# Patient Record
Sex: FEMALE | Race: WHITE | NOT HISPANIC OR LATINO | URBAN - METROPOLITAN AREA
[De-identification: names, ages, dates, MRNs, and addresses within clinical notes are randomized per-mention and may not be internally consistent; named-entity substitution may affect disease eponyms.]

---

## 2022-04-10 ENCOUNTER — HISTORICAL (OUTPATIENT)
Dept: ADMINISTRATIVE | Facility: HOSPITAL | Age: 62
End: 2022-04-10
Payer: MEDICAID

## 2022-04-26 VITALS
BODY MASS INDEX: 22.99 KG/M2 | DIASTOLIC BLOOD PRESSURE: 69 MMHG | WEIGHT: 134.69 LBS | SYSTOLIC BLOOD PRESSURE: 121 MMHG | HEIGHT: 64 IN

## 2022-06-23 ENCOUNTER — OFFICE VISIT (OUTPATIENT)
Dept: HEMATOLOGY/ONCOLOGY | Facility: CLINIC | Age: 62
End: 2022-06-23
Payer: MEDICAID

## 2022-06-23 VITALS
WEIGHT: 114.63 LBS | BODY MASS INDEX: 19.57 KG/M2 | DIASTOLIC BLOOD PRESSURE: 66 MMHG | HEART RATE: 55 BPM | RESPIRATION RATE: 15 BRPM | SYSTOLIC BLOOD PRESSURE: 150 MMHG | TEMPERATURE: 97 F | HEIGHT: 64 IN | OXYGEN SATURATION: 100 %

## 2022-06-23 DIAGNOSIS — O22.30 DVT (DEEP VEIN THROMBOSIS) IN PREGNANCY: Primary | ICD-10-CM

## 2022-06-23 PROCEDURE — 3078F PR MOST RECENT DIASTOLIC BLOOD PRESSURE < 80 MM HG: ICD-10-PCS | Mod: CPTII,,,

## 2022-06-23 PROCEDURE — 3077F PR MOST RECENT SYSTOLIC BLOOD PRESSURE >= 140 MM HG: ICD-10-PCS | Mod: CPTII,,,

## 2022-06-23 PROCEDURE — 3077F SYST BP >= 140 MM HG: CPT | Mod: CPTII,,,

## 2022-06-23 PROCEDURE — 3078F DIAST BP <80 MM HG: CPT | Mod: CPTII,,,

## 2022-06-23 PROCEDURE — 99999 PR PBB SHADOW E&M-EST. PATIENT-LVL III: CPT | Mod: PBBFAC,,,

## 2022-06-23 PROCEDURE — 1159F PR MEDICATION LIST DOCUMENTED IN MEDICAL RECORD: ICD-10-PCS | Mod: CPTII,,,

## 2022-06-23 PROCEDURE — 99213 OFFICE O/P EST LOW 20 MIN: CPT | Mod: S$PBB,,,

## 2022-06-23 PROCEDURE — 1159F MED LIST DOCD IN RCRD: CPT | Mod: CPTII,,,

## 2022-06-23 PROCEDURE — 3008F PR BODY MASS INDEX (BMI) DOCUMENTED: ICD-10-PCS | Mod: CPTII,,,

## 2022-06-23 PROCEDURE — 3008F BODY MASS INDEX DOCD: CPT | Mod: CPTII,,,

## 2022-06-23 PROCEDURE — 99213 PR OFFICE/OUTPT VISIT, EST, LEVL III, 20-29 MIN: ICD-10-PCS | Mod: S$PBB,,,

## 2022-06-23 PROCEDURE — 99999 PR PBB SHADOW E&M-EST. PATIENT-LVL III: ICD-10-PCS | Mod: PBBFAC,,,

## 2022-06-23 RX ORDER — LANOLIN ALCOHOL/MO/W.PET/CERES
1 CREAM (GRAM) TOPICAL DAILY
COMMUNITY

## 2022-06-23 RX ORDER — METOPROLOL TARTRATE 50 MG/1
1 TABLET ORAL 2 TIMES DAILY
COMMUNITY
Start: 2022-05-27

## 2022-06-23 RX ORDER — MONTELUKAST SODIUM 10 MG/1
1 TABLET ORAL DAILY
COMMUNITY
Start: 2022-05-27

## 2022-06-23 RX ORDER — PANTOPRAZOLE SODIUM 40 MG/1
1 TABLET, DELAYED RELEASE ORAL EVERY MORNING
COMMUNITY
Start: 2022-03-28

## 2022-06-23 NOTE — PROGRESS NOTES
" [facility] Hematology/Oncology  PROGRESS NOTE      Subjective:         NAME: Jenn Joshua : 1960     61 y.o. female   MRN: [id number]    Referring Doc: No ref. provider found  Other Physicians:  Referring Physician: Jacoby Grossman MD   PCP: AUTUMN López   Pulmonary: Dr. Isbell   Surgery: Dr. Sanchez       Chief Complaint:    Chief Complaint   Patient presents with    Deep Vein Thrombosis     Pt reports bilateral leg pains for x1 week.       Current Treatment:    History of Present Illness:   Ms. Joshua is a 60yo female referred by Dr. 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   At 13yo - DVT - anticoagulated for "a few months." No obvious provoking event.   At 22yo - DVT & PE (in the hospital for "pneumonia"). Anticoagulated for about 6 months. She reports she had just had her daughter a few months before.   Never on OCP.   Her maternal grandmother had blood clot in her 70s (she was admitted at the time with illness).   + weight loss - reports she fluctuates.   + chronic "sinus drip" cough. Breathing is "ok."   Smoking is down to 1/2ppd. Prior 1ppd x 15.   Pt reports MMG last week in [place] - records requested.         Interval History:  Jenn returns for f/u. Her right hip reconstruction has not been scheduled as prosthetic implants are on order. She is doing well and has not complaints. She has f/u visits with  urology  in [place]  for hyponatremia. She denies sob, chest pain, edema of BLE. She does complain of pain in her legs but relates it to problems with right hip. She uses a walker for gait mobility.     ROS:   GEN: normal without any fever, night sweats or weight loss  HEENT: normal with no HA's, sore throat, stiff neck, changes in vision  CV: normal with no CP, SOB, PND, MARTINEZ or orthopnea  PULM: normal with no SOB, cough, hemoptysis, sputum or pleuritic pain  GI: normal with no abdominal pain, nausea, vomiting, constipation, diarrhea, melanotic stools, BRBPR, or hematemesis  : normal with " no hematuria, dysuria  BREAST: normal with no mass, discharge, pain  SKIN: normal with no rash, erythema, bruising, or swelling    Allergies:  Review of patient's allergies indicates:   Allergen Reactions    Aspirin      Other reaction(s): Unknown    Ibuprofen      Other reaction(s): Unknown    Propoxyphene      Other reaction(s): Unknown       Medications:  No current outpatient medications on file.    PMHx/PSHx Updates:   Past Medical History:   Diagnosis Date    Chronic obstructive lung disease     History of DVT (deep vein thrombosis)     History of pulmonary embolus (PE)     HTN (hypertension)     Mild intermittent asthma, uncomplicated     Osteoarthritis of right hip     Systolic murmur at cardiac apex     Tobacco user      Past Surgical History:   Procedure Laterality Date    APPENDECTOMY      CHOLECYSTECTOMY      COLONOSCOPY  05/18/2017    ESOPHAGOGASTRODUODENOSCOPY  05/18/2017    HYSTERECTOMY      Left Leg Surgery         Family History:  Family History   Problem Relation Age of Onset    Heart disease Mother     Heart failure Mother     Hypertension Mother     Heart disease Father     Hypertension Father        Social History:  Social History     Tobacco Use    Smoking status: Current Every Day Smoker     Types: Cigarettes    Smokeless tobacco: Never Used   Substance and Sexual Activity    Alcohol use: Yes    Drug use: Not Currently     Comment: Prescription Medications         Pathology:        Objective:     ECOG SCORE           Vitals:  There were no vitals taken for this visit.    Physical Examination:   GEN: no apparent distress, comfortable; AAOx3  HEAD: atraumatic and normocephalic  EYES: no pallor, no icterus, PERRLA  ENT: OMM, no pharyngeal erythema, external ears WNL; no nasal discharge; no thrush  NECK: no masses, thyroid normal, trachea midline, no LAD/LN's, supple  CV: RRR with no murmur; normal pulse; normal S1 and S2; no pedal edema  CHEST: Normal respiratory effort;  CTAB; normal breath sounds; no wheeze or crackles  ABDOM: nontender and nondistended; soft; normal bowel sounds; no rebound/guarding  MUSC/Skeletal: ROM normal; no crepitus; joints normal; no deformities or arthropathy  EXTREM: no clubbing, cyanosis, inflammation or swelling  SKIN: no rashes, lesions, ulcers, petechiae or subcutaneous nodules  : no burroughs  NEURO: grossly intact; motor/sensory WNL; AAOx3; no tremors  PSYCH: normal mood, affect and behavior  LYMPH: normal cervical, supraclavicular, axillary and groin LN's      Labs:   No visits with results within 1 Week(s) from this visit.   Latest known visit with results is:   No results found for any previous visit.         Assessment/Plan:   1. H/o DVT - 2 previously, most recent at 22yo. She has not been anticoagulated since about 6mo after that clot. She has had surgeries since that time without VTE. I do not think hypercoagulable work up is indicated at this time. She does not need to resume anticoagulation at this time.   Recommend DVT prophylaxis post knee or hip surgery as per Ortho.   We did discuss signs/symptoms of PE/DVT to be on alert for.   2. Tobacco abuse - Advised smoking cessation. Patient declines assistance/intervention. Request any prior chest imaging. We discussed smoking can increase risk of clot and she should quit.         PLAN:  C/w PCP for follow-up care  RTC 1 year to discuss possible d/c from clinic  No labs needed    Discussion:     I have explained all of the above in detail and the patient understands all of the current recommendation(s). I have answered all of their questions to the best of my ability and to their complete satisfaction.   The patient is to continue with the current management plan.            Electronically signed by Germaine Toscano NP

## 2023-02-10 DIAGNOSIS — E87.1 HYPONATREMIA: Primary | ICD-10-CM

## 2023-08-08 ENCOUNTER — OFFICE VISIT (OUTPATIENT)
Dept: HEMATOLOGY/ONCOLOGY | Facility: CLINIC | Age: 63
End: 2023-08-08
Payer: MEDICAID

## 2023-08-08 VITALS
HEIGHT: 64 IN | DIASTOLIC BLOOD PRESSURE: 89 MMHG | BODY MASS INDEX: 21.04 KG/M2 | OXYGEN SATURATION: 97 % | HEART RATE: 59 BPM | SYSTOLIC BLOOD PRESSURE: 140 MMHG | RESPIRATION RATE: 18 BRPM | WEIGHT: 123.25 LBS | TEMPERATURE: 98 F

## 2023-08-08 DIAGNOSIS — R19.7 DIARRHEA: Primary | ICD-10-CM

## 2023-08-08 DIAGNOSIS — O22.30 DVT (DEEP VEIN THROMBOSIS) IN PREGNANCY: Primary | ICD-10-CM

## 2023-08-08 PROCEDURE — 99214 PR OFFICE/OUTPT VISIT, EST, LEVL IV, 30-39 MIN: ICD-10-PCS | Mod: S$PBB,,, | Performed by: NURSE PRACTITIONER

## 2023-08-08 PROCEDURE — 3008F BODY MASS INDEX DOCD: CPT | Mod: CPTII,,, | Performed by: NURSE PRACTITIONER

## 2023-08-08 PROCEDURE — 1160F RVW MEDS BY RX/DR IN RCRD: CPT | Mod: CPTII,,, | Performed by: NURSE PRACTITIONER

## 2023-08-08 PROCEDURE — 3077F SYST BP >= 140 MM HG: CPT | Mod: CPTII,,, | Performed by: NURSE PRACTITIONER

## 2023-08-08 PROCEDURE — 1159F MED LIST DOCD IN RCRD: CPT | Mod: CPTII,,, | Performed by: NURSE PRACTITIONER

## 2023-08-08 PROCEDURE — 1159F PR MEDICATION LIST DOCUMENTED IN MEDICAL RECORD: ICD-10-PCS | Mod: CPTII,,, | Performed by: NURSE PRACTITIONER

## 2023-08-08 PROCEDURE — 3077F PR MOST RECENT SYSTOLIC BLOOD PRESSURE >= 140 MM HG: ICD-10-PCS | Mod: CPTII,,, | Performed by: NURSE PRACTITIONER

## 2023-08-08 PROCEDURE — 99999 PR PBB SHADOW E&M-EST. PATIENT-LVL IV: CPT | Mod: PBBFAC,,, | Performed by: NURSE PRACTITIONER

## 2023-08-08 PROCEDURE — 3008F PR BODY MASS INDEX (BMI) DOCUMENTED: ICD-10-PCS | Mod: CPTII,,, | Performed by: NURSE PRACTITIONER

## 2023-08-08 PROCEDURE — 99999 PR PBB SHADOW E&M-EST. PATIENT-LVL IV: ICD-10-PCS | Mod: PBBFAC,,, | Performed by: NURSE PRACTITIONER

## 2023-08-08 PROCEDURE — 3079F DIAST BP 80-89 MM HG: CPT | Mod: CPTII,,, | Performed by: NURSE PRACTITIONER

## 2023-08-08 PROCEDURE — 1160F PR REVIEW ALL MEDS BY PRESCRIBER/CLIN PHARMACIST DOCUMENTED: ICD-10-PCS | Mod: CPTII,,, | Performed by: NURSE PRACTITIONER

## 2023-08-08 PROCEDURE — 3079F PR MOST RECENT DIASTOLIC BLOOD PRESSURE 80-89 MM HG: ICD-10-PCS | Mod: CPTII,,, | Performed by: NURSE PRACTITIONER

## 2023-08-08 PROCEDURE — 99214 OFFICE O/P EST MOD 30 MIN: CPT | Mod: PBBFAC | Performed by: NURSE PRACTITIONER

## 2023-08-08 PROCEDURE — 99214 OFFICE O/P EST MOD 30 MIN: CPT | Mod: S$PBB,,, | Performed by: NURSE PRACTITIONER

## 2023-08-08 RX ORDER — ONDANSETRON 4 MG/1
4 TABLET, FILM COATED ORAL EVERY 6 HOURS PRN
COMMUNITY
Start: 2023-03-03

## 2023-08-08 RX ORDER — GABAPENTIN 600 MG/1
1 TABLET ORAL DAILY
COMMUNITY
Start: 2023-07-12

## 2023-08-08 NOTE — PROGRESS NOTES
" [facility] Hematology/Oncology  PROGRESS NOTE      Subjective:         NAME: Jenn Joshua : 1960     62 y.o. female   MRN: [id number]    Referring Doc: No ref. provider found  Other Physicians:  Referring Physician: Jacoby Grossman MD   PCP: AUTUMN López   Pulmonary: Dr. Isbell   Surgery: Dr. Sanchez       Chief Complaint:    Chief Complaint   Patient presents with    Deep Vein Thrombosis     Pt reports no new issues or concerns        Current Treatment:    History of Present Illness:   Ms. Joshua is a 60yo female referred by Dr. 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   At 15yo - DVT - anticoagulated for "a few months." No obvious provoking event.   At 20yo - DVT & PE (in the hospital for "pneumonia"). Anticoagulated for about 6 months. She reports she had just had her daughter a few months before.   Never on OCP.   Her maternal grandmother had blood clot in her 70s (she was admitted at the time with illness).   + weight loss - reports she fluctuates.   + chronic "sinus drip" cough. Breathing is "ok."   Smoking is down to 1/2ppd. Prior 1ppd x 15.   Pt reports MMG last week in [place] - records requested.         Interval History:  Jenn returns for f/u. Her total hip reconstruction was 2023. She was placed on Xarelto for 2 mos s/p the procedure and now she is off. She denies any s/s of DVT or PE since the surgery. She is currently undergoing PT and remains active at home walking around. She plans on undergoing knee replacement surgery next year. She complains of diarrhea daily and abdominal burning. She has a history of ulcers and is not currently following with a GI. Her former GI is no longer in practice in the area. She had a colonoscopy a few years ago, but cannot remember when. She request referral to GI.She denies sob, chest pain, or edema of BLE. She uses a walker for gait mobility.     ROS:   GEN: normal without any fever, night sweats or weight loss  HEENT: normal with no HA's, sore " throat, stiff neck, changes in vision  CV: normal with no CP, SOB, PND, MARTINEZ or orthopnea  PULM: normal with no SOB, cough, hemoptysis, sputum or pleuritic pain  GI: normal with no abdominal pain, nausea, vomiting, constipation, +diarrhea,  BRBPR, or hematemesis  : normal with no hematuria, dysuria  BREAST: normal with no mass, discharge, pain  SKIN: normal with no rash, erythema, bruising, or swelling    Allergies:  Review of patient's allergies indicates:   Allergen Reactions    Aspirin      Other reaction(s): Unknown    Ibuprofen      Other reaction(s): Unknown    Propoxyphene      Other reaction(s): Unknown       Medications:    Current Outpatient Medications:     gabapentin (NEURONTIN) 600 MG tablet, Take 1 tablet by mouth once daily., Disp: , Rfl:     magnesium oxide (MAG-OX) 400 mg (241.3 mg magnesium) tablet, Take 1 tablet by mouth once daily., Disp: , Rfl:     metoprolol tartrate (LOPRESSOR) 50 MG tablet, Take 1 tablet by mouth 2 (two) times a day., Disp: , Rfl:     montelukast (SINGULAIR) 10 mg tablet, Take 1 tablet by mouth once daily at 6am., Disp: , Rfl:     ondansetron (ZOFRAN) 4 MG tablet, Take 4 mg by mouth every 6 (six) hours as needed., Disp: , Rfl:     pantoprazole (PROTONIX) 40 MG tablet, Take 1 tablet by mouth every morning., Disp: , Rfl:     sodium chloride 1 gram tablet, Take 1 g by mouth 3 (three) times daily., Disp: , Rfl:     PMHx/PSHx Updates:   Past Medical History:   Diagnosis Date    Chronic obstructive lung disease     History of DVT (deep vein thrombosis)     History of pulmonary embolus (PE)     HTN (hypertension)     Mild intermittent asthma, uncomplicated     Osteoarthritis of right hip     Systolic murmur at cardiac apex     Tobacco user      Past Surgical History:   Procedure Laterality Date    APPENDECTOMY      CHOLECYSTECTOMY      COLONOSCOPY  05/18/2017    ESOPHAGOGASTRODUODENOSCOPY  05/18/2017    HYSTERECTOMY      Left Leg Surgery         Family History:  Family History  "  Problem Relation Age of Onset    Heart disease Mother     Heart failure Mother     Hypertension Mother     Heart disease Father     Hypertension Father        Social History:  Social History     Socioeconomic History    Marital status: Unknown   Tobacco Use    Smoking status: Every Day     Current packs/day: 0.00     Types: Cigarettes    Smokeless tobacco: Never   Substance and Sexual Activity    Alcohol use: Yes    Drug use: Not Currently     Comment: Prescription Medications         Pathology:        Objective:     ECOG SCORE             Vitals:  Blood pressure (!) 140/89, pulse (!) 59, temperature 98.1 °F (36.7 °C), resp. rate 18, height 5' 4" (1.626 m), weight 55.9 kg (123 lb 3.8 oz), SpO2 97 %.    Physical Examination:   GEN: no apparent distress, comfortable; AAOx3  HEAD: atraumatic and normocephalic  EYES: no pallor, no icterus, PERRLA  ENT: OMM, no pharyngeal erythema, external ears WNL; no nasal discharge; no thrush  NECK: no masses, thyroid normal, trachea midline, no LAD/LN's, supple  CV: RRR with no murmur; normal pulse; normal S1 and S2; no pedal edema  CHEST: Normal respiratory effort; CTAB; normal breath sounds; no wheeze or crackles  ABDOM: nontender and nondistended; soft; normal bowel sounds; no rebound/guarding  MUSC/Skeletal: ROM normal; no crepitus; joints normal; no deformities or arthropathy  EXTREM: no clubbing, cyanosis, inflammation or swelling  SKIN: no rashes, lesions, ulcers, petechiae or subcutaneous nodules  : no burroughs  NEURO: grossly intact; motor/sensory WNL; AAOx3; no tremors  PSYCH: normal mood, affect and behavior  LYMPH: normal cervical, supraclavicular, axillary and groin LN's      Labs:   No visits with results within 1 Week(s) from this visit.   Latest known visit with results is:   No results found for any previous visit.         Assessment/Plan:   1. H/o DVT - 2 previously, last DVT at 20yo. She has not been anticoagulated since about 6mo after that clot. She has had " surgeries since that time without VTE. No hypercoagulable work up is indicated at this time. She does not need to resume anticoagulation at this time.   Recommend DVT prophylaxis post knee or hip surgery as per Ortho.   We did discuss signs/symptoms of PE/DVT to be on alert for.   2. Tobacco abuse - Advised smoking cessation. Patient declines assistance/intervention. We discussed smoking can increase risk of clot and she should quit.         PLAN:  C/w PCP for follow-up care  Refer to GI   RTC 1 year in preparation for knee surgery and possible DVT prevention therapy.   No labs needed    Discussion:     I have explained all of the above in detail and the patient understands all of the current recommendation(s). I have answered all of their questions to the best of my ability and to their complete satisfaction.   The patient is to continue with the current management plan.            Electronically signed by Carmita Justice NP

## 2023-09-15 ENCOUNTER — DOCUMENTATION ONLY (OUTPATIENT)
Dept: HEMATOLOGY/ONCOLOGY | Facility: CLINIC | Age: 63
End: 2023-09-15
Payer: MEDICAID

## 2023-09-15 NOTE — PROGRESS NOTES
[facility] GI clinic called to check on GI Referral. They informed me that they area waiting on paper from previous doctor. Patient called to ask which GI have she seen before, but she was sleeping. Spoke with patient , but he didn't know doctor name.

## 2024-07-25 DIAGNOSIS — O22.30 DVT (DEEP VEIN THROMBOSIS) IN PREGNANCY: Primary | ICD-10-CM

## 2024-08-01 ENCOUNTER — LAB VISIT (OUTPATIENT)
Dept: LAB | Facility: HOSPITAL | Age: 64
End: 2024-08-01
Payer: MEDICAID

## 2024-08-01 ENCOUNTER — OFFICE VISIT (OUTPATIENT)
Dept: HEMATOLOGY/ONCOLOGY | Facility: CLINIC | Age: 64
End: 2024-08-01
Payer: MEDICAID

## 2024-08-01 VITALS
SYSTOLIC BLOOD PRESSURE: 129 MMHG | DIASTOLIC BLOOD PRESSURE: 73 MMHG | BODY MASS INDEX: 20.28 KG/M2 | RESPIRATION RATE: 18 BRPM | TEMPERATURE: 98 F | WEIGHT: 118.81 LBS | OXYGEN SATURATION: 98 % | HEART RATE: 94 BPM | HEIGHT: 64 IN

## 2024-08-01 DIAGNOSIS — O22.30 DVT (DEEP VEIN THROMBOSIS) IN PREGNANCY: Primary | ICD-10-CM

## 2024-08-01 DIAGNOSIS — O22.30 DVT (DEEP VEIN THROMBOSIS) IN PREGNANCY: ICD-10-CM

## 2024-08-01 LAB
ALBUMIN SERPL-MCNC: 4.3 G/DL (ref 3.4–4.8)
ALBUMIN/GLOB SERPL: 1.3 RATIO (ref 1.1–2)
ALP SERPL-CCNC: 84 UNIT/L (ref 40–150)
ALT SERPL-CCNC: 9 UNIT/L (ref 0–55)
ANION GAP SERPL CALC-SCNC: 10 MEQ/L
APTT PPP: 33.8 SECONDS (ref 24.6–37.2)
AST SERPL-CCNC: 19 UNIT/L (ref 5–34)
BASOPHILS # BLD AUTO: 0.06 X10(3)/MCL
BASOPHILS NFR BLD AUTO: 1.1 %
BILIRUB SERPL-MCNC: 0.6 MG/DL
BUN SERPL-MCNC: 7 MG/DL (ref 9.8–20.1)
CALCIUM SERPL-MCNC: 9.9 MG/DL (ref 8.4–10.2)
CHLORIDE SERPL-SCNC: 93 MMOL/L (ref 98–107)
CO2 SERPL-SCNC: 24 MMOL/L (ref 23–31)
CREAT SERPL-MCNC: 0.92 MG/DL (ref 0.55–1.02)
CREAT/UREA NIT SERPL: 8
EOSINOPHIL # BLD AUTO: 0.22 X10(3)/MCL (ref 0–0.9)
EOSINOPHIL NFR BLD AUTO: 4 %
ERYTHROCYTE [DISTWIDTH] IN BLOOD BY AUTOMATED COUNT: 13.5 % (ref 11.5–17)
GFR SERPLBLD CREATININE-BSD FMLA CKD-EPI: >60 ML/MIN/1.73/M2
GLOBULIN SER-MCNC: 3.2 GM/DL (ref 2.4–3.5)
GLUCOSE SERPL-MCNC: 110 MG/DL (ref 82–115)
HCT VFR BLD AUTO: 39 % (ref 37–47)
HGB BLD-MCNC: 13.7 G/DL (ref 12–16)
IMM GRANULOCYTES # BLD AUTO: 0.02 X10(3)/MCL (ref 0–0.04)
IMM GRANULOCYTES NFR BLD AUTO: 0.4 %
INR PPP: 0.9
LYMPHOCYTES # BLD AUTO: 1.63 X10(3)/MCL (ref 0.6–4.6)
LYMPHOCYTES NFR BLD AUTO: 29.6 %
MCH RBC QN AUTO: 32.2 PG (ref 27–31)
MCHC RBC AUTO-ENTMCNC: 35.1 G/DL (ref 33–36)
MCV RBC AUTO: 91.5 FL (ref 80–94)
MONOCYTES # BLD AUTO: 0.41 X10(3)/MCL (ref 0.1–1.3)
MONOCYTES NFR BLD AUTO: 7.4 %
NEUTROPHILS # BLD AUTO: 3.17 X10(3)/MCL (ref 2.1–9.2)
NEUTROPHILS NFR BLD AUTO: 57.5 %
PLATELET # BLD AUTO: 279 X10(3)/MCL (ref 130–400)
PMV BLD AUTO: 9.6 FL (ref 7.4–10.4)
POTASSIUM SERPL-SCNC: 4.9 MMOL/L (ref 3.5–5.1)
PROT SERPL-MCNC: 7.5 GM/DL (ref 5.8–7.6)
PROTHROMBIN TIME: 12.8 SECONDS (ref 12.5–14.5)
RBC # BLD AUTO: 4.26 X10(6)/MCL (ref 4.2–5.4)
SODIUM SERPL-SCNC: 127 MMOL/L (ref 136–145)
WBC # BLD AUTO: 5.51 X10(3)/MCL (ref 4.5–11.5)

## 2024-08-01 PROCEDURE — 85025 COMPLETE CBC W/AUTO DIFF WBC: CPT

## 2024-08-01 PROCEDURE — 85610 PROTHROMBIN TIME: CPT

## 2024-08-01 PROCEDURE — 85730 THROMBOPLASTIN TIME PARTIAL: CPT

## 2024-08-01 PROCEDURE — 36415 COLL VENOUS BLD VENIPUNCTURE: CPT

## 2024-08-01 PROCEDURE — 99999 PR PBB SHADOW E&M-EST. PATIENT-LVL IV: CPT | Mod: PBBFAC,,, | Performed by: INTERNAL MEDICINE

## 2024-08-01 PROCEDURE — 99214 OFFICE O/P EST MOD 30 MIN: CPT | Mod: PBBFAC | Performed by: INTERNAL MEDICINE

## 2024-08-01 PROCEDURE — 80053 COMPREHEN METABOLIC PANEL: CPT

## 2024-08-01 RX ORDER — ACETAMINOPHEN AND CODEINE PHOSPHATE 300; 30 MG/1; MG/1
TABLET ORAL
COMMUNITY

## 2024-08-01 RX ORDER — ALBUTEROL SULFATE 0.83 MG/ML
SOLUTION RESPIRATORY (INHALATION)
COMMUNITY

## 2024-08-01 RX ORDER — BUTALBITAL, ACETAMINOPHEN AND CAFFEINE 50; 325; 40 MG/1; MG/1; MG/1
TABLET ORAL
COMMUNITY

## 2024-08-01 RX ORDER — FLUTICASONE FUROATE, UMECLIDINIUM BROMIDE AND VILANTEROL TRIFENATATE 100; 62.5; 25 UG/1; UG/1; UG/1
POWDER RESPIRATORY (INHALATION)
COMMUNITY
Start: 2024-07-19

## 2024-08-01 RX ORDER — AMLODIPINE BESYLATE 10 MG/1
TABLET ORAL
COMMUNITY

## 2024-08-01 NOTE — PROGRESS NOTES
" [facility] Hematology/Oncology  PROGRESS NOTE      Subjective:         NAME: Jenn Joshua : 1960     63 y.o. female   MRN: [id number]    Referring Doc: No ref. provider found  Other Physicians:  Referring Physician: Jacoby Grossman MD   PCP: AUTUMN López   Pulmonary: Dr. Isbell   Surgery: Dr. Sanchez       Chief Complaint:    No chief complaint on file.      Current Treatment:    History of Present Illness:   Ms. Joshua is a 62yo female referred by Dr. 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 She was seen by Dr. Araujo in   At 15yo - DVT - anticoagulated for "a few months." No obvious provoking event.   At 20yo - DVT & PE (in the hospital for "pneumonia"). Anticoagulated for about 6 months. She reports she had just had her daughter a few months before.   Never on OCP.   Her maternal grandmother had blood clot in her 70s (she was admitted at the time with illness).   + weight loss - reports she fluctuates.   + chronic "sinus drip" cough. Breathing is "ok."   Smoking is down to 1/2ppd. Prior 1ppd x 15.   Pt reports MMG last week in [place] - records requested.     Interval History:  24:  Unclear why pateint is here. Her surgery was a  year ago. She is not on Xarelto. She is using a walker.     23 Jenn returns for f/u. Her total hip reconstruction was 2023. She was placed on Xarelto for 2 mos s/p the procedure and now she is off. She denies any s/s of DVT or PE since the surgery. She is currently undergoing PT and remains active at home walking around. She plans on undergoing knee replacement surgery next year. She complains of diarrhea daily and abdominal burning. She has a history of ulcers and is not currently following with a GI. Her former GI is no longer in practice in the area. She had a colonoscopy a few years ago, but cannot remember when. She request referral to GI.She denies sob, chest pain, or edema of BLE. She uses a walker for gait mobility.     ROS:   GEN: normal without " any fever, night sweats or weight loss  HEENT: normal with no HA's, sore throat, stiff neck, changes in vision  CV: normal with no CP, SOB, PND, MARTINEZ or orthopnea  PULM: normal with no SOB, cough, hemoptysis, sputum or pleuritic pain  GI: normal with no abdominal pain, nausea, vomiting, constipation, +diarrhea,  BRBPR, or hematemesis  : normal with no hematuria, dysuria  BREAST: normal with no mass, discharge, pain  SKIN: normal with no rash, erythema, bruising, or swelling    Allergies:  Review of patient's allergies indicates:   Allergen Reactions    Aspirin      Other reaction(s): Unknown    Ibuprofen      Other reaction(s): Unknown    Propoxyphene      Other reaction(s): Unknown       Medications:    Current Outpatient Medications:     gabapentin (NEURONTIN) 600 MG tablet, Take 1 tablet by mouth once daily., Disp: , Rfl:     magnesium oxide (MAG-OX) 400 mg (241.3 mg magnesium) tablet, Take 1 tablet by mouth once daily., Disp: , Rfl:     metoprolol tartrate (LOPRESSOR) 50 MG tablet, Take 1 tablet by mouth 2 (two) times a day., Disp: , Rfl:     montelukast (SINGULAIR) 10 mg tablet, Take 1 tablet by mouth once daily at 6am., Disp: , Rfl:     ondansetron (ZOFRAN) 4 MG tablet, Take 4 mg by mouth every 6 (six) hours as needed., Disp: , Rfl:     pantoprazole (PROTONIX) 40 MG tablet, Take 1 tablet by mouth every morning., Disp: , Rfl:     sodium chloride 1 gram tablet, Take 1 g by mouth 3 (three) times daily., Disp: , Rfl:     PMHx/PSHx Updates:   Past Medical History:   Diagnosis Date    Chronic obstructive lung disease     History of DVT (deep vein thrombosis)     History of pulmonary embolus (PE)     HTN (hypertension)     Mild intermittent asthma, uncomplicated     Osteoarthritis of right hip     Systolic murmur at cardiac apex     Tobacco user      Past Surgical History:   Procedure Laterality Date    APPENDECTOMY      CHOLECYSTECTOMY      COLONOSCOPY  05/18/2017    ESOPHAGOGASTRODUODENOSCOPY  05/18/2017     HYSTERECTOMY      Left Leg Surgery         Family History:  Family History   Problem Relation Name Age of Onset    Heart disease Mother      Heart failure Mother      Hypertension Mother      Heart disease Father      Hypertension Father         Social History:  Social History     Socioeconomic History    Marital status: Unknown   Tobacco Use    Smoking status: Every Day     Types: Cigarettes    Smokeless tobacco: Never   Substance and Sexual Activity    Alcohol use: Yes    Drug use: Not Currently     Comment: Prescription Medications         Pathology:        Objective:     ECOG SCORE             Vitals:  There were no vitals taken for this visit.    Physical Examination:   GEN: no apparent distress, comfortable; AAOx3  HEAD: atraumatic and normocephalic  EYES: no pallor, no icterus, PERRLA  ENT: OMM, no pharyngeal erythema, external ears WNL; no nasal discharge; no thrush  NECK: no masses, thyroid normal, trachea midline, no LAD/LN's, supple  CV: RRR with no murmur; normal pulse; normal S1 and S2; no pedal edema  CHEST: Normal respiratory effort; CTAB; normal breath sounds; no wheeze or crackles  ABDOM: nontender and nondistended; soft; normal bowel sounds; no rebound/guarding  MUSC/Skeletal: ROM normal; no crepitus; joints normal; no deformities or arthropathy  EXTREM: no clubbing, cyanosis, inflammation or swelling  SKIN: no rashes, lesions, ulcers, petechiae or subcutaneous nodules  : no burroughs  NEURO: grossly intact; motor/sensory WNL; AAOx3; no tremors  PSYCH: normal mood, affect and behavior  LYMPH: normal cervical, supraclavicular, axillary and groin LN's      Labs:   No visits with results within 1 Week(s) from this visit.   Latest known visit with results is:   No results found for any previous visit.         Assessment/Plan:   1. H/o DVT - 2 previously, last DVT at 22yo. She has not been anticoagulated since about 6mo after that clot. She has had surgeries since that time without VTE. No hypercoagulable  work up is indicated at this time. She does not need to resume anticoagulation at this time.   Recommend DVT prophylaxis post knee or hip surgery as per Ortho.   We did discuss signs/symptoms of PE/DVT to be on alert for.   2. Tobacco abuse - Advised smoking cessation. Patient declines assistance/intervention. We discussed smoking can increase risk of clot and she should quit.   3. Has hyponatremia. She was on tablets. She is seeing Nephrology.   4.  Active smoker. She is getting low dose CT chest through Dr. Reyes in [place].   5. She is UTD with colonoscopies and MMG.     PLAN:  C/w PCP for follow-up care  Refer to GI   No labs needed    Discussion:   She has no further surgeries planned.   I have explained all of the above in detail and the patient understands all of the current recommendation(s). I have answered all of their questions to the best of my ability and to their complete satisfaction.   The patient is to continue with the current management plan.            Electronically signed by Chelsie Powers MD

## 2025-07-23 DIAGNOSIS — O22.30 DVT (DEEP VEIN THROMBOSIS) IN PREGNANCY: Primary | ICD-10-CM

## 2025-07-31 NOTE — PROGRESS NOTES
" [facility] Hematology/Oncology  PROGRESS NOTE      Subjective:         NAME: Jenn Joshua : 1960     64 y.o. female   MRN: [id number]    Referring Doc: No ref. provider found  Other Physicians:  Referring Physician: Jacoby Grossman MD   PCP: AUTUMN López   Pulmonary: Dr. Isbell   Surgery: Dr. Sanchez       Chief Complaint:    Chief Complaint   Patient presents with    Deep Vein Thrombosis     F/u with labs-- Pt reports no new concerns        Current Treatment:    History of Present Illness:   Ms. Joshua is a 60yo female referred by Dr. 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 She was seen by Dr. Araujo in   At 15yo - DVT - anticoagulated for "a few months." No obvious provoking event.   At 20yo - DVT & PE (in the hospital for "pneumonia"). Anticoagulated for about 6 months. She reports she had just had her daughter a few months before.   Never on OCP.   Her maternal grandmother had blood clot in her 70s (she was admitted at the time with illness).   + weight loss - reports she fluctuates.   + chronic "sinus drip" cough. Breathing is "ok."   Smoking is down to 1/2ppd. Prior 1ppd x 15.   Pt reports MMG last week in [place] - records requested.     Interval History:  2025: She returns to the clinic today for an annual follow-up. She feels well today without any concerns or complaints. No evidence or symptoms of DVT today. She continues without anticoagulation. She continues to be followed by pcp and nephrology. She continues to smoke and decline smoking cessation assistance. She states that she would like to only follow-up with PCP for further care at this time.     24:  Unclear why pateint is here. Her surgery was a  year ago. She is not on Xarelto. She is using a walker.     23 Jenn returns for f/u. Her total hip reconstruction was 2023. She was placed on Xarelto for 2 mos s/p the procedure and now she is off. She denies any s/s of DVT or PE since the surgery. She is currently " undergoing PT and remains active at home walking around. She plans on undergoing knee replacement surgery next year. She complains of diarrhea daily and abdominal burning. She has a history of ulcers and is not currently following with a GI. Her former GI is no longer in practice in the area. She had a colonoscopy a few years ago, but cannot remember when. She request referral to GI.She denies sob, chest pain, or edema of BLE. She uses a walker for gait mobility.     ROS:   GEN: normal without any fever, night sweats or weight loss  HEENT: normal with no HA's, sore throat, stiff neck, changes in vision  CV: normal with no CP, SOB, PND, MARTINEZ or orthopnea  PULM: normal with no SOB, cough, hemoptysis, sputum or pleuritic pain  GI: normal with no abdominal pain, nausea, vomiting, constipation, +diarrhea,  BRBPR, or hematemesis  : normal with no hematuria, dysuria  BREAST: normal with no mass, discharge, pain  SKIN: normal with no rash, erythema, bruising, or swelling    Allergies:  Review of patient's allergies indicates:   Allergen Reactions    Aspirin      Other reaction(s): Unknown    Ibuprofen      Other reaction(s): Unknown    Propoxyphene      Other reaction(s): Unknown    Codeine Other (See Comments) and Rash    Lisinopril Other (See Comments) and Rash       Medications:    Current Outpatient Medications:     carvediloL (COREG) 6.25 MG tablet, Take 6.25 mg by mouth 2 (two) times daily., Disp: , Rfl:     pregabalin (LYRICA) 75 MG capsule, Take 75 mg by mouth 2 (two) times daily., Disp: , Rfl:     topiramate (TOPAMAX) 25 MG tablet, Take 25 mg by mouth every evening., Disp: , Rfl:     acetaminophen-codeine 300-30mg (TYLENOL #3) 300-30 mg Tab, TAKE 1 TABLET BY MOUTH DAILY AT BEDTIME. AS NEEDED FOR PAINS., Disp: , Rfl:     pantoprazole (PROTONIX) 40 MG tablet, Take 1 tablet by mouth every morning., Disp: , Rfl:     TRELEGY ELLIPTA 100-62.5-25 mcg DsDv, , Disp: , Rfl:     PMHx/PSHx Updates:   Past Medical History:  "  Diagnosis Date    Chronic obstructive lung disease     History of DVT (deep vein thrombosis)     History of pulmonary embolus (PE)     HTN (hypertension)     Mild intermittent asthma, uncomplicated     Osteoarthritis of right hip     Systolic murmur at cardiac apex     Tobacco user      Past Surgical History:   Procedure Laterality Date    APPENDECTOMY      CHOLECYSTECTOMY      COLONOSCOPY  05/18/2017    ESOPHAGOGASTRODUODENOSCOPY  05/18/2017    HYSTERECTOMY      Left Leg Surgery         Family History:  Family History   Problem Relation Name Age of Onset    Heart disease Mother      Heart failure Mother      Hypertension Mother      Heart disease Father      Hypertension Father         Social History:  Social History     Socioeconomic History    Marital status: Unknown   Tobacco Use    Smoking status: Every Day     Types: Cigarettes    Smokeless tobacco: Never   Substance and Sexual Activity    Alcohol use: Yes    Drug use: Not Currently     Comment: Prescription Medications         Pathology:        Objective:     ECOG SCORE    1 - Restricted in strenuous activity-ambulatory and able to carry out work of a light nature         Vitals:  Blood pressure (!) 163/75, pulse 72, temperature 98.1 °F (36.7 °C), resp. rate 20, height 5' 4.02" (1.626 m), weight 52.1 kg (114 lb 14.4 oz), SpO2 99%.    Physical Examination:   GEN: no apparent distress, comfortable; AAOx3  HEAD: atraumatic and normocephalic  EYES: no pallor, no icterus, PERRLA  ENT: OMM, no pharyngeal erythema, external ears WNL; no nasal discharge; no thrush  NECK: no masses, thyroid normal, trachea midline, no LAD/LN's, supple  CV: RRR with no murmur; normal pulse; normal S1 and S2; no pedal edema  CHEST: Normal respiratory effort; CTAB; normal breath sounds; no wheeze or crackles  ABDOM: nontender and nondistended; soft; normal bowel sounds; no rebound/guarding  MUSC/Skeletal: ROM normal; no crepitus; joints normal; no deformities or arthropathy  EXTREM: no " clubbing, cyanosis, inflammation or swelling  SKIN: no rashes, lesions, ulcers, petechiae or subcutaneous nodules  : no burroughs  NEURO: grossly intact; motor/sensory WNL; AAOx3; no tremors  PSYCH: normal mood, affect and behavior  LYMPH: normal cervical, supraclavicular, axillary and groin LN's      Labs:   Lab Visit on 08/01/2025   Component Date Value Ref Range Status    Sodium 08/01/2025 132 (L)  136 - 145 mmol/L Final    Potassium 08/01/2025 4.6  3.5 - 5.1 mmol/L Final    Chloride 08/01/2025 97 (L)  98 - 107 mmol/L Final    CO2 08/01/2025 24  23 - 31 mmol/L Final    Glucose 08/01/2025 69 (L)  82 - 115 mg/dL Final    Blood Urea Nitrogen 08/01/2025 13.0  9.8 - 20.1 mg/dL Final    Creatinine 08/01/2025 1.38 (H)  0.55 - 1.02 mg/dL Final    Calcium 08/01/2025 9.3  8.4 - 10.2 mg/dL Final    Protein Total 08/01/2025 7.3  5.8 - 7.6 gm/dL Final    Albumin 08/01/2025 3.8  3.4 - 4.8 g/dL Final    Globulin 08/01/2025 3.5  2.4 - 3.5 gm/dL Final    Albumin/Globulin Ratio 08/01/2025 1.1  1.1 - 2.0 ratio Final    Bilirubin Total 08/01/2025 0.4  <=1.5 mg/dL Final    ALP 08/01/2025 94  40 - 150 unit/L Final    ALT 08/01/2025 8  0 - 55 unit/L Final    AST 08/01/2025 13  11 - 45 unit/L Final    eGFR 08/01/2025 43  mL/min/1.73/m2 Final    Estimated GFR calculated using the CKD-EPI creatinine (2021) equation.    Anion Gap 08/01/2025 11.0  mEq/L Final    BUN/Creatinine Ratio 08/01/2025 9   Final    WBC 08/01/2025 6.59  4.50 - 11.50 x10(3)/mcL Final    RBC 08/01/2025 4.23  4.20 - 5.40 x10(6)/mcL Final    Hgb 08/01/2025 12.8  12.0 - 16.0 g/dL Final    Hct 08/01/2025 38.4  37.0 - 47.0 % Final    MCV 08/01/2025 90.8  80.0 - 94.0 fL Final    MCH 08/01/2025 30.3  27.0 - 31.0 pg Final    MCHC 08/01/2025 33.3  33.0 - 36.0 g/dL Final    RDW 08/01/2025 14.2  11.5 - 17.0 % Final    Platelet 08/01/2025 328  130 - 400 x10(3)/mcL Final    MPV 08/01/2025 9.4  7.4 - 10.4 fL Final    Neut % 08/01/2025 58.6  % Final    Lymph % 08/01/2025 27.6  % Final  "   Mono % 08/01/2025 9.1  % Final    Eos % 08/01/2025 3.3  % Final    Basophil % 08/01/2025 0.9  % Final    Imm Grans % 08/01/2025 0.5  % Final    Neut # 08/01/2025 3.86  2.1 - 9.2 x10(3)/mcL Final    Lymph # 08/01/2025 1.82  0.6 - 4.6 x10(3)/mcL Final    Mono # 08/01/2025 0.60  0.1 - 1.3 x10(3)/mcL Final    Eos # 08/01/2025 0.22  0 - 0.9 x10(3)/mcL Final    Baso # 08/01/2025 0.06  <=0.2 x10(3)/mcL Final    Imm Gran # 08/01/2025 0.03  0.00 - 0.04 x10(3)/mcL Final    NRBC% 08/01/2025 0.0  % Final         Assessment/Plan:   1. H/o DVT - 2 previously, last DVT at 20yo. She has not been anticoagulated since about 6mo after that clot. She has had surgeries since that time without VTE. No hypercoagulable work up is indicated at this time. She does not need to resume anticoagulation at this time.   Recommend DVT prophylaxis post knee or hip surgery as per Ortho.   We did discuss signs/symptoms of PE/DVT to be on alert for. She verbalized understanding. Discussed taking aspirin daily for prevention, she states that aspirin "makes my blood thick"  2. Tobacco abuse - Advised smoking cessation. Patient declines assistance/intervention. We discussed smoking can increase risk of clot and she should quit.   3. Has hyponatremia. She was on tablets. She is seeing Nephrology.   4.  Active smoker. She is getting low dose CT chest through Dr. Reyes in [place].   5. She is UTD with colonoscopies and MMG.     PLAN:  C/w PCP for follow-up care  Follow-up with GI as scheduled   RTC as needed    Discussion:   She has no further surgeries planned.   I have explained all of the above in detail and the patient understands all of the current recommendation(s). I have answered all of their questions to the best of my ability and to their complete satisfaction.   The patient is to continue with the current management plan.            Electronically signed by AUTUMN Masterson              "

## 2025-08-01 ENCOUNTER — OFFICE VISIT (OUTPATIENT)
Facility: CLINIC | Age: 65
End: 2025-08-01
Payer: MEDICAID

## 2025-08-01 ENCOUNTER — LAB VISIT (OUTPATIENT)
Dept: LAB | Facility: HOSPITAL | Age: 65
End: 2025-08-01
Payer: MEDICAID

## 2025-08-01 VITALS
RESPIRATION RATE: 20 BRPM | SYSTOLIC BLOOD PRESSURE: 163 MMHG | BODY MASS INDEX: 19.61 KG/M2 | WEIGHT: 114.88 LBS | HEIGHT: 64 IN | TEMPERATURE: 98 F | DIASTOLIC BLOOD PRESSURE: 75 MMHG | HEART RATE: 72 BPM | OXYGEN SATURATION: 99 %

## 2025-08-01 DIAGNOSIS — O22.30 DVT (DEEP VEIN THROMBOSIS) IN PREGNANCY: Primary | ICD-10-CM

## 2025-08-01 DIAGNOSIS — O22.30 DVT (DEEP VEIN THROMBOSIS) IN PREGNANCY: ICD-10-CM

## 2025-08-01 LAB
ALBUMIN SERPL-MCNC: 3.8 G/DL (ref 3.4–4.8)
ALBUMIN/GLOB SERPL: 1.1 RATIO (ref 1.1–2)
ALP SERPL-CCNC: 94 UNIT/L (ref 40–150)
ALT SERPL-CCNC: 8 UNIT/L (ref 0–55)
ANION GAP SERPL CALC-SCNC: 11 MEQ/L
AST SERPL-CCNC: 13 UNIT/L (ref 11–45)
BASOPHILS # BLD AUTO: 0.06 X10(3)/MCL
BASOPHILS NFR BLD AUTO: 0.9 %
BILIRUB SERPL-MCNC: 0.4 MG/DL
BUN SERPL-MCNC: 13 MG/DL (ref 9.8–20.1)
CALCIUM SERPL-MCNC: 9.3 MG/DL (ref 8.4–10.2)
CHLORIDE SERPL-SCNC: 97 MMOL/L (ref 98–107)
CO2 SERPL-SCNC: 24 MMOL/L (ref 23–31)
CREAT SERPL-MCNC: 1.38 MG/DL (ref 0.55–1.02)
CREAT/UREA NIT SERPL: 9
EOSINOPHIL # BLD AUTO: 0.22 X10(3)/MCL (ref 0–0.9)
EOSINOPHIL NFR BLD AUTO: 3.3 %
ERYTHROCYTE [DISTWIDTH] IN BLOOD BY AUTOMATED COUNT: 14.2 % (ref 11.5–17)
GFR SERPLBLD CREATININE-BSD FMLA CKD-EPI: 43 ML/MIN/1.73/M2
GLOBULIN SER-MCNC: 3.5 GM/DL (ref 2.4–3.5)
GLUCOSE SERPL-MCNC: 69 MG/DL (ref 82–115)
HCT VFR BLD AUTO: 38.4 % (ref 37–47)
HGB BLD-MCNC: 12.8 G/DL (ref 12–16)
IMM GRANULOCYTES # BLD AUTO: 0.03 X10(3)/MCL (ref 0–0.04)
IMM GRANULOCYTES NFR BLD AUTO: 0.5 %
LYMPHOCYTES # BLD AUTO: 1.82 X10(3)/MCL (ref 0.6–4.6)
LYMPHOCYTES NFR BLD AUTO: 27.6 %
MCH RBC QN AUTO: 30.3 PG (ref 27–31)
MCHC RBC AUTO-ENTMCNC: 33.3 G/DL (ref 33–36)
MCV RBC AUTO: 90.8 FL (ref 80–94)
MONOCYTES # BLD AUTO: 0.6 X10(3)/MCL (ref 0.1–1.3)
MONOCYTES NFR BLD AUTO: 9.1 %
NEUTROPHILS # BLD AUTO: 3.86 X10(3)/MCL (ref 2.1–9.2)
NEUTROPHILS NFR BLD AUTO: 58.6 %
NRBC BLD AUTO-RTO: 0 %
PLATELET # BLD AUTO: 328 X10(3)/MCL (ref 130–400)
PMV BLD AUTO: 9.4 FL (ref 7.4–10.4)
POTASSIUM SERPL-SCNC: 4.6 MMOL/L (ref 3.5–5.1)
PROT SERPL-MCNC: 7.3 GM/DL (ref 5.8–7.6)
RBC # BLD AUTO: 4.23 X10(6)/MCL (ref 4.2–5.4)
SODIUM SERPL-SCNC: 132 MMOL/L (ref 136–145)
WBC # BLD AUTO: 6.59 X10(3)/MCL (ref 4.5–11.5)

## 2025-08-01 PROCEDURE — 36415 COLL VENOUS BLD VENIPUNCTURE: CPT

## 2025-08-01 PROCEDURE — 99213 OFFICE O/P EST LOW 20 MIN: CPT | Mod: PBBFAC

## 2025-08-01 PROCEDURE — 99999 PR PBB SHADOW E&M-EST. PATIENT-LVL III: CPT | Mod: PBBFAC,,,

## 2025-08-01 PROCEDURE — 85025 COMPLETE CBC W/AUTO DIFF WBC: CPT

## 2025-08-01 PROCEDURE — 80053 COMPREHEN METABOLIC PANEL: CPT

## 2025-08-01 RX ORDER — CARVEDILOL 6.25 MG/1
6.25 TABLET ORAL 2 TIMES DAILY
COMMUNITY
Start: 2025-06-24

## 2025-08-01 RX ORDER — TOPIRAMATE 25 MG/1
25 TABLET, FILM COATED ORAL NIGHTLY
COMMUNITY
Start: 2025-06-24

## 2025-08-01 RX ORDER — PREGABALIN 75 MG/1
75 CAPSULE ORAL 2 TIMES DAILY
COMMUNITY
Start: 2025-06-24